# Patient Record
Sex: MALE | Race: WHITE | NOT HISPANIC OR LATINO | URBAN - METROPOLITAN AREA
[De-identification: names, ages, dates, MRNs, and addresses within clinical notes are randomized per-mention and may not be internally consistent; named-entity substitution may affect disease eponyms.]

---

## 2017-07-20 ENCOUNTER — IMPORTED ENCOUNTER (OUTPATIENT)
Dept: URBAN - METROPOLITAN AREA CLINIC 31 | Facility: CLINIC | Age: 27
End: 2017-07-20

## 2017-07-20 PROCEDURE — 92015 DETERMINE REFRACTIVE STATE: CPT

## 2017-07-20 PROCEDURE — 92014 COMPRE OPH EXAM EST PT 1/>: CPT

## 2017-08-03 ENCOUNTER — IMPORTED ENCOUNTER (OUTPATIENT)
Dept: URBAN - METROPOLITAN AREA CLINIC 31 | Facility: CLINIC | Age: 27
End: 2017-08-03

## 2017-08-03 PROCEDURE — 92310 CONTACT LENS FITTING OU: CPT

## 2017-08-03 NOTE — PATIENT DISCUSSION
Dispense trial contacts OU. To discontinue and call if any problems. Dispense Bio[employer] solution. F/u 2 week CL check.

## 2017-08-17 ENCOUNTER — IMPORTED ENCOUNTER (OUTPATIENT)
Dept: URBAN - METROPOLITAN AREA CLINIC 31 | Facility: CLINIC | Age: 27
End: 2017-08-17

## 2018-11-05 ENCOUNTER — IMPORTED ENCOUNTER (OUTPATIENT)
Dept: URBAN - METROPOLITAN AREA CLINIC 31 | Facility: CLINIC | Age: 28
End: 2018-11-05

## 2018-11-05 PROCEDURE — 92015 DETERMINE REFRACTIVE STATE: CPT

## 2018-11-05 PROCEDURE — 92014 COMPRE OPH EXAM EST PT 1/>: CPT

## 2018-11-05 PROCEDURE — V2521 CNTCT LENS HYDROPHILIC TORIC: HCPCS

## 2018-11-05 PROCEDURE — 92310 CONTACT LENS FITTING OU: CPT

## 2021-05-12 ENCOUNTER — IMPORTED ENCOUNTER (OUTPATIENT)
Dept: URBAN - METROPOLITAN AREA CLINIC 31 | Facility: CLINIC | Age: 31
End: 2021-05-12

## 2021-05-12 PROCEDURE — 92015 DETERMINE REFRACTIVE STATE: CPT

## 2021-05-12 PROCEDURE — 92310 CONTACT LENS FITTING OU: CPT

## 2021-05-12 PROCEDURE — 92014 COMPRE OPH EXAM EST PT 1/>: CPT

## 2021-05-12 NOTE — PATIENT DISCUSSION
1.  Refractive error2. Astigmatism Annual Good ocular health documented. Discussed options of glasses contacts or refractive surgery. Discussed importance of annual eye exams. Rx specs and CLs.

## 2022-04-01 ASSESSMENT — VISUAL ACUITY
OS_CC: 20/40-2
OS_SC: J114''
OD_SC: 20/20
OD_SC: J114''
OS_CC: 20/30+1
OD_PH: SC 20/20
OD_CC: 20/40
OD_CC: 20/30
OS_SC: 20/20
OS_PH: SC 20/25

## 2022-04-01 ASSESSMENT — TONOMETRY
OD_IOP_MMHG: 16
OD_IOP_MMHG: 14
OS_IOP_MMHG: 14
OS_IOP_MMHG: 16
OD_IOP_MMHG: 14